# Patient Record
Sex: FEMALE | Race: WHITE | URBAN - METROPOLITAN AREA
[De-identification: names, ages, dates, MRNs, and addresses within clinical notes are randomized per-mention and may not be internally consistent; named-entity substitution may affect disease eponyms.]

---

## 2018-06-26 ENCOUNTER — PAT TELEPHONE (OUTPATIENT)
Dept: PREADMISSION TESTING | Age: 73
End: 2018-06-26

## 2018-06-26 VITALS — WEIGHT: 223 LBS | BODY MASS INDEX: 35.84 KG/M2 | HEIGHT: 66 IN

## 2018-06-26 RX ORDER — HYDROCORTISONE ACETATE 0.5 %
CREAM (GRAM) TOPICAL
COMMUNITY

## 2018-06-26 RX ORDER — M-VIT,TX,IRON,MINS/CALC/FOLIC 27MG-0.4MG
1 TABLET ORAL DAILY
COMMUNITY

## 2018-07-03 ENCOUNTER — HOSPITAL ENCOUNTER (OUTPATIENT)
Dept: ENDOSCOPY | Age: 73
Discharge: HOME | End: 2018-07-03
Attending: INTERNAL MEDICINE | Admitting: INTERNAL MEDICINE

## 2018-07-03 VITALS
SYSTOLIC BLOOD PRESSURE: 141 MMHG | HEIGHT: 66 IN | WEIGHT: 225 LBS | OXYGEN SATURATION: 100 % | RESPIRATION RATE: 16 BRPM | TEMPERATURE: 98.3 F | DIASTOLIC BLOOD PRESSURE: 81 MMHG | HEART RATE: 71 BPM | BODY MASS INDEX: 36.16 KG/M2

## 2018-07-03 DIAGNOSIS — Z86.010 HISTORY OF COLONIC POLYPS: ICD-10-CM

## 2018-07-03 RX ORDER — SODIUM CHLORIDE 0.9 % (FLUSH) 0.9 %
10 SYRINGE (ML) INJECTION EVERY 12 HOURS SCHEDULED
Status: DISCONTINUED | OUTPATIENT
Start: 2018-07-03 | End: 2018-07-04 | Stop reason: HOSPADM

## 2018-07-03 RX ORDER — LABETALOL HYDROCHLORIDE 5 MG/ML
5 INJECTION, SOLUTION INTRAVENOUS EVERY 10 MIN PRN
Status: DISCONTINUED | OUTPATIENT
Start: 2018-07-03 | End: 2018-07-04 | Stop reason: HOSPADM

## 2018-07-03 RX ORDER — SODIUM CHLORIDE 9 MG/ML
INJECTION, SOLUTION INTRAVENOUS CONTINUOUS
Status: DISCONTINUED | OUTPATIENT
Start: 2018-07-03 | End: 2018-07-04 | Stop reason: HOSPADM

## 2018-07-03 RX ORDER — DIPHENHYDRAMINE HYDROCHLORIDE 50 MG/ML
12.5 INJECTION INTRAMUSCULAR; INTRAVENOUS
Status: ACTIVE | OUTPATIENT
Start: 2018-07-03 | End: 2018-07-03

## 2018-07-03 RX ORDER — PROMETHAZINE HYDROCHLORIDE 25 MG/ML
6.25 INJECTION, SOLUTION INTRAMUSCULAR; INTRAVENOUS
Status: ACTIVE | OUTPATIENT
Start: 2018-07-03 | End: 2018-07-03

## 2018-07-03 RX ORDER — SODIUM CHLORIDE 0.9 % (FLUSH) 0.9 %
10 SYRINGE (ML) INJECTION PRN
Status: DISCONTINUED | OUTPATIENT
Start: 2018-07-03 | End: 2018-07-04 | Stop reason: HOSPADM

## 2018-07-03 RX ORDER — SODIUM CHLORIDE, SODIUM LACTATE, POTASSIUM CHLORIDE, CALCIUM CHLORIDE 600; 310; 30; 20 MG/100ML; MG/100ML; MG/100ML; MG/100ML
INJECTION, SOLUTION INTRAVENOUS CONTINUOUS
Status: DISCONTINUED | OUTPATIENT
Start: 2018-07-03 | End: 2018-07-03

## 2018-07-03 RX ORDER — HYDRALAZINE HYDROCHLORIDE 20 MG/ML
5 INJECTION INTRAMUSCULAR; INTRAVENOUS EVERY 10 MIN PRN
Status: DISCONTINUED | OUTPATIENT
Start: 2018-07-03 | End: 2018-07-04 | Stop reason: HOSPADM

## 2018-07-03 RX ORDER — MORPHINE SULFATE 4 MG/ML
1 INJECTION, SOLUTION INTRAMUSCULAR; INTRAVENOUS EVERY 5 MIN PRN
Status: DISCONTINUED | OUTPATIENT
Start: 2018-07-03 | End: 2018-07-04 | Stop reason: HOSPADM

## 2018-07-03 RX ORDER — OXYCODONE HYDROCHLORIDE AND ACETAMINOPHEN 5; 325 MG/1; MG/1
2 TABLET ORAL PRN
Status: ACTIVE | OUTPATIENT
Start: 2018-07-03 | End: 2018-07-03

## 2018-07-03 RX ORDER — OXYCODONE HYDROCHLORIDE AND ACETAMINOPHEN 5; 325 MG/1; MG/1
1 TABLET ORAL PRN
Status: ACTIVE | OUTPATIENT
Start: 2018-07-03 | End: 2018-07-03

## 2018-07-03 RX ORDER — LIDOCAINE HYDROCHLORIDE 10 MG/ML
1 INJECTION, SOLUTION EPIDURAL; INFILTRATION; INTRACAUDAL; PERINEURAL
Status: ACTIVE | OUTPATIENT
Start: 2018-07-03 | End: 2018-07-03

## 2018-07-03 RX ORDER — ONDANSETRON 2 MG/ML
4 INJECTION INTRAMUSCULAR; INTRAVENOUS
Status: ACTIVE | OUTPATIENT
Start: 2018-07-03 | End: 2018-07-03

## 2018-07-03 RX ORDER — MEPERIDINE HYDROCHLORIDE 25 MG/ML
12.5 INJECTION INTRAMUSCULAR; INTRAVENOUS; SUBCUTANEOUS EVERY 5 MIN PRN
Status: DISCONTINUED | OUTPATIENT
Start: 2018-07-03 | End: 2018-07-04 | Stop reason: HOSPADM

## 2018-07-03 RX ORDER — MORPHINE SULFATE 4 MG/ML
2 INJECTION, SOLUTION INTRAMUSCULAR; INTRAVENOUS EVERY 5 MIN PRN
Status: DISCONTINUED | OUTPATIENT
Start: 2018-07-03 | End: 2018-07-04 | Stop reason: HOSPADM

## 2018-07-03 RX ADMIN — SODIUM CHLORIDE: 9 INJECTION, SOLUTION INTRAVENOUS at 07:07

## 2018-07-03 ASSESSMENT — PAIN SCALES - GENERAL: PAINLEVEL_OUTOF10: 0

## 2018-07-03 ASSESSMENT — PAIN - FUNCTIONAL ASSESSMENT: PAIN_FUNCTIONAL_ASSESSMENT: 0-10

## 2018-07-03 ASSESSMENT — PAIN DESCRIPTION - LOCATION: LOCATION: PELVIS

## 2018-07-03 NOTE — ANESTHESIA PRE-OP
complications:   Airway: Mallampati: II  TM distance: >3 FB   Neck ROM: full  Mouth opening: > = 3 FB Dental: normal exam         Pulmonary:Negative Pulmonary ROS and normal exam                               Cardiovascular:Negative CV ROS  Exercise tolerance: good (>4 METS),                     Neuro/Psych:   Negative Neuro/Psych ROS              GI/Hepatic/Renal: Neg GI/Hepatic/Renal ROS       (-) hiatal hernia and GERD       Endo/Other: Negative Endo/Other ROS   (+) malignancy/cancer (Breast Cancer). Abdominal:           Vascular:                                     Pre-Operative Diagnosis: POLYP SURVEILLANCE/ MCR, C    71 y.o.   BMI:  Body mass index is 36.32 kg/m². Vitals:    07/03/18 0656   BP: (!) 141/90   Pulse: 72   Resp: 16   Temp: 97.7 °F (36.5 °C)   TempSrc: Temporal   SpO2: 99%   Weight: 225 lb (102.1 kg)   Height: 5' 6\" (1.676 m)       No Known Allergies    Social History   Substance Use Topics    Smoking status: Never Smoker    Smokeless tobacco: Never Used    Alcohol use Yes       LABS:    CBC  No results found for: WBC, HGB, HCT, PLT  RENAL  No results found for: NA, K, CL, CO2, BUN, CREATININE, GLUCOSE  COAGS  No results found for: PROTIME, INR, APTT     Anesthesia Plan      general     ASA 2     (I discussed with the patient the risks and benefits of PIV, general anesthesia, IV Narcotics, PACU. All questions were answered the patient agrees with the plan.)  Induction: intravenous. Anesthetic plan and risks discussed with patient. Plan discussed with CRNA.                   Keiry Aleman MD   7/3/2018

## 2018-07-03 NOTE — PROCEDURES
[facility]  Endoscopy Note    Patient: Ana Maria Winn  : 1945  Acct#: [de-identified]    Procedure: Colonoscopy with biopsy    Date:  7/3/2018    Surgeon:  Terry Brady MD    Referring Physician:  Cleveland Barahona    Previous Colonoscopy: Yes  Date: 08  Greater than 3 years? Yes    Preoperative Diagnosis:  surveillance    Postoperative Diagnosis:  Colon polyp    Anesthesia:  See anesthesia note    Indications: This is a 71y.o. year old female who presents today with previous adenomatous polyp. Procedure: An informed consent was obtained from the patient after explanation of indications, benefits, possible risks and complications of the procedure. The patient was then taken to the endoscopy suite, placed in the left lateral decubitus position, and the above IV anesthesia was administered. A digital rectal examination was performed and revealed negative without mass, lesions or tenderness. The Olympus CFQ-180-AL video colonoscope was placed in the patient's rectum under digital direction and advanced to the cecum. The cecum was identified by characteristic anatomy and ballottment. The ileocecal valve was identified. The preparation was excellent. The scope was then withdrawn back through the cecum, ascending, transverse, descending and sigmoid colons. Carefull circumferential examination of the mucosa in these areas demonstrated a 5 mm polyp on a fold in the ascending colon that was biopsied and removed. The scope was then withdrawn into the rectum and retroflexed. The retroflexed view of the anal verge and rectum demonstrates no abnormalities. The scope was straightened, the colon was decompressed and the scope was withdrawn from the patient. The patient tolerated the procedure well and was taken to the PACU in good condition. Estimated Blood Loss:  none    Impression:  Colon polyp    Recommendations:  Await pathology. Repeat colonoscopy in 5 years.     Terry Brady MD

## 2018-07-03 NOTE — PROGRESS NOTES
Dr. Radha Holly here to speak with pt. And her . Discharge instructions discussed. Understanding verbalized.

## 2018-07-03 NOTE — H&P
[facility]   Pre-operative History and Physical    Patient: Kristofer Silverman  : 1945  Acct#: [de-identified]    History Obtained From: electronic medical record    HISTORY OF PRESENT ILLNESS  Procedure:Colonoscopy  Indications:surveillance  Past Medical History:        Diagnosis Date    Cancer [facility])     BREAST     Past Surgical History:        Procedure Laterality Date    BREAST RECONSTRUCTION      CHOLECYSTECTOMY      ECTOPIC PREGNANCY SURGERY      GALLBLADDER SURGERY      MASTECTOMY       Medications Prior to Admission:   Current Outpatient Prescriptions on File Prior to Encounter   Medication Sig Dispense Refill    Multiple Vitamins-Minerals (THERAPEUTIC MULTIVITAMIN-MINERALS) tablet Take 1 tablet by mouth daily      Glucosamine-Chondroit-Vit C-Mn (GLUCOSAMINE 1500 COMPLEX) CAPS Take by mouth       No current facility-administered medications on file prior to encounter. Allergies:  Patient has no known allergies. Social History     Social History    Marital status:      Spouse name: N/A    Number of children: N/A    Years of education: N/A     Occupational History    Not on file. Social History Main Topics    Smoking status: Never Smoker    Smokeless tobacco: Never Used    Alcohol use Yes    Drug use: Unknown    Sexual activity: Not on file     Other Topics Concern    Not on file     Social History Narrative    No narrative on file     Family History   Problem Relation Age of Onset    Cancer Mother         LUNG    Heart Disease Father     Other Father         AAA         PHYSICAL EXAM:      BP (!) 141/90   Pulse 72   Temp 97.7 °F (36.5 °C) (Temporal)   Resp 16   Ht 5' 6\" (1.676 m)   Wt 225 lb (102.1 kg)   SpO2 99%   BMI 36.32 kg/m²  I        Heart:normal    Lungs: normal    Abdomen: normal      ASA Grade:  See anesthesia note      ASSESSMENT AND PLAN:    1. Procedure options, risks and benefits reviewed with patient and expresses understanding.

## 2019-03-18 ENCOUNTER — HOSPITAL ENCOUNTER (OUTPATIENT)
Dept: PHYSICAL THERAPY | Age: 74
Setting detail: THERAPIES SERIES
Discharge: HOME | End: 2019-03-18
Payer: MEDICARE

## 2019-03-18 PROCEDURE — 97530 THERAPEUTIC ACTIVITIES: CPT

## 2019-03-18 PROCEDURE — 97161 PT EVAL LOW COMPLEX 20 MIN: CPT

## 2019-03-20 ENCOUNTER — HOSPITAL ENCOUNTER (OUTPATIENT)
Dept: PHYSICAL THERAPY | Age: 74
Setting detail: THERAPIES SERIES
Discharge: HOME | End: 2019-03-20
Payer: MEDICARE

## 2019-03-20 PROCEDURE — G0283 ELEC STIM OTHER THAN WOUND: HCPCS

## 2019-03-20 PROCEDURE — 97140 MANUAL THERAPY 1/> REGIONS: CPT

## 2019-03-20 PROCEDURE — 97530 THERAPEUTIC ACTIVITIES: CPT

## 2019-03-22 ENCOUNTER — HOSPITAL ENCOUNTER (OUTPATIENT)
Dept: PHYSICAL THERAPY | Age: 74
Setting detail: THERAPIES SERIES
Discharge: HOME | End: 2019-03-22
Payer: MEDICARE

## 2019-03-22 PROCEDURE — G0283 ELEC STIM OTHER THAN WOUND: HCPCS

## 2019-03-22 PROCEDURE — 97110 THERAPEUTIC EXERCISES: CPT

## 2019-03-22 PROCEDURE — 97140 MANUAL THERAPY 1/> REGIONS: CPT

## 2019-03-22 PROCEDURE — 97530 THERAPEUTIC ACTIVITIES: CPT

## 2019-03-25 ENCOUNTER — HOSPITAL ENCOUNTER (OUTPATIENT)
Dept: PHYSICAL THERAPY | Age: 74
Setting detail: THERAPIES SERIES
Discharge: HOME | End: 2019-03-25
Payer: MEDICARE

## 2019-03-25 PROCEDURE — 97110 THERAPEUTIC EXERCISES: CPT

## 2019-03-25 PROCEDURE — G0283 ELEC STIM OTHER THAN WOUND: HCPCS

## 2019-03-27 ENCOUNTER — HOSPITAL ENCOUNTER (OUTPATIENT)
Dept: PHYSICAL THERAPY | Age: 74
Setting detail: THERAPIES SERIES
Discharge: HOME | End: 2019-03-27
Payer: MEDICARE

## 2019-03-27 PROCEDURE — 97110 THERAPEUTIC EXERCISES: CPT

## 2019-03-27 PROCEDURE — G0283 ELEC STIM OTHER THAN WOUND: HCPCS

## 2019-03-27 PROCEDURE — 97112 NEUROMUSCULAR REEDUCATION: CPT

## 2019-03-29 ENCOUNTER — HOSPITAL ENCOUNTER (OUTPATIENT)
Dept: PHYSICAL THERAPY | Age: 74
Setting detail: THERAPIES SERIES
Discharge: HOME | End: 2019-03-29
Payer: MEDICARE

## 2019-03-29 PROCEDURE — 97110 THERAPEUTIC EXERCISES: CPT

## 2019-04-01 ENCOUNTER — HOSPITAL ENCOUNTER (OUTPATIENT)
Dept: PHYSICAL THERAPY | Age: 74
Setting detail: THERAPIES SERIES
Discharge: HOME | End: 2019-04-01
Payer: MEDICARE

## 2019-04-01 PROCEDURE — 97110 THERAPEUTIC EXERCISES: CPT

## 2019-04-01 NOTE — FLOWSHEET NOTE
Physical Therapy Daily Treatment Note  Date:  2019    Patient Name:  Anish Frias    :  1945  MRN: [id number]  Restrictions/Precautions:    Medical/Treatment Diagnosis Information:   · Diagnosis: R TKR performed on 19  · Treatment Diagnosis: Decreased R Knee AROM, Strength and Balance/Proprioception    Tracking Information:  Physician Information Referring Practitioner: Dr. Kathi Maldonado of Care Sent Date: 3/18/19 Signed Received: 3/26   Visit Count / Total Visits      Insurance Approved Visits  Med Nec Approved Dates:     Insurance Information PT Insurance Information: Medicare    Progress Note/G-codes   []  Yes  [x]  No Next Due: 10th session     Pain level: 4/10      Subjective:  Pt reports this is really early for her. Slept really well on Saturday night because she was busy on Saturday.  she was really sore - thinks she needs to get up and move more when she is doing things. Took a pain pill this morning. Objective:   Observation:   3/25: pt amb with SPC unsure if at the correct height   3/22: Pt to clinic with RW, assessed ambulation with cane, pt safe to progress to cane. Test measurements:    : AROM lacking 2 deg to 100 deg  3/25: AROM: lacking 3 deg to 95 deg  3/22: R knee flexion: 96 deg, R knee extension: lacking 5 deg.      Exercises:  Exercise/Equipment Resistance/Repetitions Other comments   Bike    nustep     level 5 seat 9 x 5 minutes, VCs for flexing and ext as far as possible     stairs HSS 2 x 20 sec on 2nd 6 in step   Knee flexion stretch 2 x 20 sec on 2nd 6 inch step    Hip flexion from 6 inch step x 10 B  without UE support x 10 B       VCs for neutral knee and foot   // bars           amb with neutral foot position B x 80 feet with  no UEs     LAQ with 4# weight sitting EOM x 10 on R    TG Squats 2 x 10  Single leg squats on R x 10 small ROM, x 10 on L normal ROM      Gait training         IB/HR/TR 30 sec x 2, x 10 each     cables TKE 2.5 plates 2 x 10                                     Other Therapeutic Activities:  3/18/19: Patient education on PT and plan of care including diagnosis, prognosis, treatment goals and options. Home Exercise Program:  3/18/19: The following exercises were performed and added to the pt's home program (educated on appropriate frequency, intensity and duration etc.): Towel Knee Flexion/Extension Stretch, Ankle pumps.  Distributed HO     Manual Treatments:    3/22: PROM R knee flexion/extension x 8 min   3/20: PROM R knee flexion/extension x 12 min    Modalities:    4/1, 3/29: CP to R knee in reclined sitting x 10 minutes  3/27, 3/25, 3/22, 3/20: CP + ESTIM (IFC) R knee x 15 min     Timed Code Treatment Minutes: 28    Total Treatment Minutes:   38     Treatment/Activity Tolerance:  [x] Patient tolerated treatment well [] Patient limited by fatigue  [] Patient limited by pain  [] Patient limited by other medical complications  [x] Other: quad strength improving as well as AROM    Prognosis: [x] Good [] Fair  [] Poor    Patient Requires Follow-up: [x] Yes  [] No    Plan:   [x] Continue per plan of care [] Alter current plan (see comments)  [] Plan of care initiated [] Hold pending MD visit [] Discharge    Plan for Next Session:   Manual to improve knee ROM, gait training, // bars/balance/proprioception training, LE strengthening, Modalities prn for pain     Electronically signed by:  Isai Blanchard, PT, DPT

## 2019-04-03 ENCOUNTER — HOSPITAL ENCOUNTER (OUTPATIENT)
Dept: PHYSICAL THERAPY | Age: 74
Setting detail: THERAPIES SERIES
Discharge: HOME | End: 2019-04-03
Payer: MEDICARE

## 2019-04-03 PROCEDURE — 97110 THERAPEUTIC EXERCISES: CPT

## 2019-04-03 NOTE — FLOWSHEET NOTE
Physical Therapy Daily Treatment Note  Date:  4/3/2019    Patient Name:  Ramonita Fernández    :  1945  MRN: [id number]  Restrictions/Precautions:    Medical/Treatment Diagnosis Information:   · Diagnosis: R TKR performed on 19  · Treatment Diagnosis: Decreased R Knee AROM, Strength and Balance/Proprioception    Tracking Information:  Physician Information Referring Practitioner: Dr. Mary Ann Harper of Care Sent Date: 3/18/19 Signed Received: 3/26   Visit Count / Total Visits      Insurance Approved Visits  Med Nec Approved Dates:     Insurance Information PT Insurance Information: Medicare    Progress Note/G-codes   []  Yes  [x]  No Next Due: 10th session     Pain level: 2/10      Subjective:  Pt reports she hasn't had a lot of pain today. Went to a  this morning and was working on her income taxes. Leaving Friday for vacation. Got in her hot tub last night. Objective:   Observation:   3/25: pt amb with SPC unsure if at the correct height   3/22: Pt to clinic with RW, assessed ambulation with cane, pt safe to progress to cane. Test measurements:    : AROM lacking 2 deg to 100 deg  3/25: AROM: lacking 3 deg to 95 deg  3/22: R knee flexion: 96 deg, R knee extension: lacking 5 deg.      Exercises:  Exercise/Equipment Resistance/Repetitions Other comments   Bike    nustep x5 min, seat 15 level 1  half revs to full retro revs to forward full revs         stairs HSS 2 x 20 sec on 2nd 6 in step   Knee flexion stretch 2 x 20 sec on 2nd 6 inch step         VCs for neutral knee and foot   // bars           amb with neutral foot position B x 80 feet with  no UEs    mat     TG Squats 2 x 10  Single leg squats on R x 10 normal ROM      Gait training     amb without AD and HHA x 30 feet VCs for heel toe patten and exaggerated knee flexion     amb without AD or UE support x 30 feet with nomalized pattern     IB/HR/TR 30 sec x 2, x 10 each     cables TKE 3 plates  2 x 10 Other Therapeutic Activities:  3/18/19: Patient education on PT and plan of care including diagnosis, prognosis, treatment goals and options. Home Exercise Program:    4/3: issued HO of squats, TKE, heel slides, LAQ, hip abd with band, 3 way hip with band, HS stretch, hip flexor stretch, SLR   3/18/19: The following exercises were performed and added to the pt's home program (educated on appropriate frequency, intensity and duration etc.): Towel Knee Flexion/Extension Stretch, Ankle pumps.  Distributed HO     Manual Treatments:    3/22: PROM R knee flexion/extension x 8 min   3/20: PROM R knee flexion/extension x 12 min    Modalities:    4/3, 4/1, 3/29: CP to R knee in reclined sitting x 10 minutes  3/27, 3/25, 3/22, 3/20: CP + ESTIM (IFC) R knee x 15 min     Timed Code Treatment Minutes: 25    Total Treatment Minutes:   35     Treatment/Activity Tolerance:  [x] Patient tolerated treatment well [] Patient limited by fatigue  [] Patient limited by pain  [] Patient limited by other medical complications  [x] Other: pt able to complete full revolution on bike this date, gait pattern improving     Prognosis: [x] Good [] Fair  [] Poor    Patient Requires Follow-up: [x] Yes  [] No    Plan:   [x] Continue per plan of care [] Alter current plan (see comments)  [] Plan of care initiated [] Hold pending MD visit [] Discharge    Plan for Next Session:   Manual to improve knee ROM, gait training, // bars/balance/proprioception training, LE strengthening, Modalities prn for pain     Electronically signed by:  Severa Pollen, PT, DPT

## 2019-04-17 ENCOUNTER — HOSPITAL ENCOUNTER (OUTPATIENT)
Dept: PHYSICAL THERAPY | Age: 74
Setting detail: THERAPIES SERIES
Discharge: HOME | End: 2019-04-17
Payer: MEDICARE

## 2019-04-17 PROCEDURE — G0283 ELEC STIM OTHER THAN WOUND: HCPCS

## 2019-04-17 PROCEDURE — 97140 MANUAL THERAPY 1/> REGIONS: CPT

## 2019-04-17 PROCEDURE — 97530 THERAPEUTIC ACTIVITIES: CPT

## 2019-04-17 PROCEDURE — 97110 THERAPEUTIC EXERCISES: CPT

## 2019-04-17 NOTE — FLOWSHEET NOTE
Physical Therapy Daily Treatment Note  Date:  2019    Patient Name:  Deanne Chowdhury    :  1945  MRN: [id number]  Restrictions/Precautions:    Medical/Treatment Diagnosis Information:   · Diagnosis: R TKR performed on 19  · Treatment Diagnosis: Decreased R Knee AROM, Strength and Balance/Proprioception    Tracking Information:  Physician Information Referring Practitioner: Dr. Sanchez Round of Care Sent Date: 3/18/19 Signed Received: 3/26   Visit Count / Total Visits      Insurance Approved Visits  Med Nec Approved Dates:     Insurance Information PT Insurance Information: Medicare    Progress Note/G-codes   []  Yes  [x]  No Next Due: 10th session     Pain level: 2/10      Subjective:  Pt reports that she is doing well, just got back from her trip from [facility]. Pt reports that during the trip her knee felt okay, but she did notice that she was more tired than usual.  Feels like she is able to walk a little faster than before. Is anxious to be 100%. States that she was not as diligent with her exercises as she maybe should have been. Objective:   Observation:   3/25: pt amb with SPC unsure if at the correct height   3/22: Pt to clinic with RW, assessed ambulation with cane, pt safe to progress to cane. Test measurements:    : PROM R knee flexion: 105 deg, extension: lacking 5 deg. : AROM lacking 2 deg to 100 deg  3/25: AROM: lacking 3 deg to 95 deg  3/22: R knee flexion: 96 deg, R knee extension: lacking 5 deg.      Exercises:  Exercise/Equipment Resistance/Repetitions Other comments   Bike    nustep x5 min, seat 15 level 1  half revs to full retro revs to forward full revs         stairs HSS 2 x 20 sec on 2nd 6 in step   Knee flexion stretch 3 x 20 sec on 2nd 6 inch step      6 inch forward step ups x 10 leading R with 1 UE support focus on eccentric control6 inch lat step ups leading R x 10 with 1 UE support, focus on eccentric control   VCs for neutral knee and foot   // bars           amb with neutral foot position B x 80 feet with  no UEs    mat     TG Squats 2 x 10        Gait training             IB/HR/TR 30 sec x 2, x 10 each     cables TKE 3 plates  x 10, 5 sec hold. Other Therapeutic Activities:  3/18/19: Patient education on PT and plan of care including diagnosis, prognosis, treatment goals and options. Home Exercise Program:    4/17: Stressed importance of quad sets and prolonged extension prop to regain full knee extension. 4/3: issued HO of squats, TKE, heel slides, LAQ, hip abd with band, 3 way hip with band, HS stretch, hip flexor stretch, SLR   3/18/19: The following exercises were performed and added to the pt's home program (educated on appropriate frequency, intensity and duration etc.): Towel Knee Flexion/Extension Stretch, Ankle pumps. Distributed HO     Manual Treatments:    4/17, 3/22: PROM R knee flexion/extension x 8 min   3/20: PROM R knee flexion/extension x 12 min    Modalities:    4/17: CP + ESTIM R knee in extension prop with foam preventing ER at the hip for 1/2 the time. x15 min. 4/3, 4/1, 3/29: CP to R knee in reclined sitting x 10 minutes  3/27, 3/25, 3/22, 3/20: CP + ESTIM (IFC) R knee x 15 min     Timed Code Treatment Minutes: 38    Total Treatment Minutes:   53     Treatment/Activity Tolerance:  [x] Patient tolerated treatment well [] Patient limited by fatigue  [] Patient limited by pain  [] Patient limited by other medical complications  [x] Other: Pt stiff as she has not had therapy in 10 days d/t trip. Increased pain with manual techniques. Pt continues to demo gait impairments from knee stiffness, aggressively stretched this date.       Prognosis: [x] Good [] Fair  [] Poor    Patient Requires Follow-up: [x] Yes  [] No    Plan:   [x] Continue per plan of care [] Alter current plan (see comments)  [] Plan of care initiated [] Hold pending MD visit [] Discharge    Plan for Next Session:

## 2019-04-19 ENCOUNTER — HOSPITAL ENCOUNTER (OUTPATIENT)
Dept: PHYSICAL THERAPY | Age: 74
Setting detail: THERAPIES SERIES
Discharge: HOME | End: 2019-04-19
Payer: MEDICARE

## 2019-04-19 PROCEDURE — 97110 THERAPEUTIC EXERCISES: CPT

## 2019-04-19 PROCEDURE — 97140 MANUAL THERAPY 1/> REGIONS: CPT

## 2019-04-19 ASSESSMENT — PAIN DESCRIPTION - ORIENTATION: ORIENTATION: RIGHT

## 2019-04-19 ASSESSMENT — PAIN DESCRIPTION - LOCATION: LOCATION: KNEE

## 2019-04-19 ASSESSMENT — PAIN SCALES - GENERAL: PAINLEVEL_OUTOF10: 3

## 2019-04-19 NOTE — PROGRESS NOTES
Outpatient Physical Therapy    Phone: [phone] Fax: [phone]    Physical Therapy Progress Note  Date: 2019        Patient Name:  Ramon Ding    :  1945  MRN: [id number]  Restrictions/Precautions:      Medical/Treatment Diagnosis Information:  · Diagnosis: R TKR   · Treatment Diagnosis: Decreased R Knee AROM, Sdecreased strength, decreased balance/proprioception  Insurance/Certification information:  PT Insurance Information: Medicare   Physician Information:  Referring Practitioner: Dr. Moriah Grey of care signed (Y/N): Y   Visit# / total visits:  10/18  Pain level: 3/10; 5/10 stiffness     Time Period for Report: 3/18/19 - 19  Cancels/No-shows to date: 0     Plan of Care/Treatment to date:  X Therapeutic Exercise      X Modalities:  X Therapeutic Activity        ? Ultrasound    X Gait Training         ? Cervical Traction   X Neuromuscular Re-education      ? Cold/hotpack    X Instruction in HEP        ? Lumbar Traction  X Manual Therapy        ? Electrical Stimulation            ? Aquatic Therapy        ? Iontophoresis            ? Lymphedema management  ? Women's Health     Other:  ? Vestibular Rehab        ?    ?  Needed                        Significant Findings At Last Visit/Comments:    Subjective:  Subjective  Subjective: The patient reports she has noticed improvement since beginning PT, and is no longer using an AD. She comments that she continues to have difficulty with squatting, prolonged standing, stairs and knee flexion. Pain is much less, and stiffness has become more of a limitation lately. She reports walking has improved, but remains a limitation.    Pain Screening  Patient Currently in Pain: Yes  Pain Assessment  Pain Assessment: 0-10  Pain Level: 3(5/10 stiffness)  Pain Location: Knee  Pain Orientation: Right      Objective:         Observation/Palpation  Palpation: +1 TTP medial joint line, globalized knee  Observation: stiffened appearance of R knee flexion with gait; circumduction w/ RLE step up  AROM RLE (degrees)  R Knee Flexion 0-145: 110(AAROM)  R Knee Extension 0: lack 5deg(AAROM)  Strength RLE  R Hip Flexion: 3+/5  R Hip ABduction: 4-/5  R Knee Flexion: 4-/5  R Knee Extension: 4-/5  R Ankle Dorsiflexion: 5/5  R Ankle Inversion: 5/5  Tone RLE  RLE Tone: Normotonic  Tone LLE  LLE Tone: Normotonic  Motor Control  Gross Motor?: WFL(decreased eccentric control)     Sensation  Overall Sensation Status: WFL  Bed Mobility  Scooting: Independent  Transfers  Sit to Stand: Modified independent(hand reliance)  Stand to sit: Independent  Ambulation  Ambulation?: Yes  Ambulation 1  Surface: level tile  Device: No Device  Assistance: Independent  Quality of Gait: stiffened RLE in swing phase  Distance: 150ft in clinic  Stairs/Curb  Stairs?: Yes  Stairs  Rails: Bilateral  Assistance: Modified independent   Comment: nonreciprocal down stairs, reciprocal up; circumduction of RLE ascending stairs  Balance  Posture: Fair  Sitting - Static: Good  Sitting - Dynamic: Good  Standing - Static: Fair  Standing - Dynamic: Fair  Tandem Stance R Leg: 10  Single Leg Stance R Leg: 10  Comments: NBOS = 10sec, Semi-tandem R = 10sec        Functional Outcome Measures:  LEFS Total Score: 43         Assessment:  Conditions Requiring Skilled Therapeutic Intervention  Body structures, Functions, Activity limitations: Decreased functional mobility , Decreased high-level IADLs, Decreased ADL status, Decreased endurance, Decreased ROM, Decreased strength, Decreased balance, Increased Pain  Assessment: The patient has progressed with PT services in regards to strength, ROM, activity tolerance and pain levels. The patient continues to demonstrate weakness in the RLE (hip and knee), as well as limited knee ROM. The patient's activity tolerance continues to need improvement, specifically, squatting, stair navigation, prolonged standing and walking.  The patient should continue with PT services at this time to further facilitate improvement across areas of limitation. Treatment Diagnosis: Decreased R Knee AROM, Sdecreased strength, decreased balance/proprioception  Prognosis: Good  REQUIRES PT FOLLOW UP: Yes    Plan:   Plan  Times per week: 2-3x  Plan weeks: x 6 weeks   Current Treatment Recommendations: Strengthening, Gait Training, Manual Therapy - Joint Manipulation, Aquatics, ROM, Stair training, IADL Training, Equipment Evaluation, Education, & procurement, Balance Training, Neuromuscular Re-education, Pain Management, Modalities, Functional Mobility Training, Endurance Training, Manual Therapy - Soft Tissue Mobilization, Home Exercise Program, Positioning, Safety Education & Training, Transfer Training, ADL/Self-care Training, Patient/Caregiver Education & Training          Progress towards goals:    Long term goals  Time Frame for Long term goals : 6 weeks   Long term goal 1: HEP: Patient will be independent with HEP to improve muscle strength and function as well as decrease risk of falls/injury. - PROGRESSING  Long term goal 2: Ambulation Tolerance: Patient will be able to walk 30' or greater to demonstrate increased ability to shop at grocery and ambulate in community. - PROGRESSING  Long term goal 3: AROM: R Knee ROM to 0-120 to demonstrate improved gait mechanics and improved ability to perform daily activities and dressing. - PROGRESSING  Long term goal 4: MMT: Patient will demonstrate 4+/5 or higher MMT grades throughout BLEs to improve function and ability to perform ADLs. - PROGRESSING  Long term goal 5: Stairs: Patient able to traverse stairs with reciprocal pattern using only one hand rail to demonstrate return to prior level of function. - PROGRESSING    Current Frequency/Duration:  # Days per week: ? 1 day # Weeks: ? 1 week ? 4 weeks      X 2 days   ? 2 weeks ? 5 weeks      ? 3 days   ? 3 weeks X 6-8 weeks     Rehab Potential: ? Excellent X Good ? Fair  ? Poor     Goal Status:  ?

## 2019-04-22 ENCOUNTER — HOSPITAL ENCOUNTER (OUTPATIENT)
Dept: PHYSICAL THERAPY | Age: 74
Setting detail: THERAPIES SERIES
Discharge: HOME | End: 2019-04-22
Payer: MEDICARE

## 2019-04-22 PROCEDURE — 97110 THERAPEUTIC EXERCISES: CPT

## 2019-04-22 NOTE — FLOWSHEET NOTE
Physical Therapy Daily Treatment Note  Date:  2019    Patient Name:  Makayla Collins    :  1945  MRN: [id number]  Restrictions/Precautions:    Medical/Treatment Diagnosis Information:   · Diagnosis: R TKR performed on 19  · Treatment Diagnosis: Decreased R Knee AROM, Strength and Balance/Proprioception    Tracking Information:  Physician Information Referring Practitioner: Dr. Wil Small of Care Sent Date: 3/18/19 Signed Received: 3/26   Visit Count / Total Visits      Insurance Approved Visits  Med Nec Approved Dates:     Insurance Information PT Insurance Information: Medicare    Progress Note/G-codes   []  Yes  [x]  No Next Due:  session     Pain level: 3/10, stiffness 3/10      Subjective:  Pt reports she isn't walking with AD. Has been doing exercises at home. Hasn't been icing at home. Feels that she still can't get her knee totally straight. Objective:   Observation:   3/25: pt amb with SPC unsure if at the correct height   3/22: Pt to clinic with RW, assessed ambulation with cane, pt safe to progress to cane. Test measurements:    : AAROM on TG flexion 105 deg, ext lacking 5 def - end of session AROM flexion 110 deg to lacking 4 deg ext  : PROM R knee flexion: 105 deg, extension: lacking 5 deg. : AROM lacking 2 deg to 100 deg  3/25: AROM: lacking 3 deg to 95 deg  3/22: R knee flexion: 96 deg, R knee extension: lacking 5 deg.      Exercises:  Exercise/Equipment Resistance/Repetitions Other comments   Bike          nustep x5 min, seat 15 level 1   full retro revs to forward full revs         stairs        VCs for neutral knee and foot   // bars           mat     TG Squats 2 x 10    Single leg squats on R x 10 small ROM      Gait training         amb through clinic without AD and without gait abnormalities     IB/HR/TR    cables 3 way hip 1 plate x 10 on R with 1-2 UE support, VCs for posture and knee ext     Ballet barre Retro gliders x 10 B with B UE support, focus on R knee flexion and ext     Lateral gliders x 10 B with B UE support, VCs for form and weight bearing      new gym Leg press 70# x 10, 80# x 10  Leg ext 15# x 10  Ham curl 35# x 10                          Other Therapeutic Activities:    4/19 - The patient was provided an assessment including evaluative tests and measures, as well as documentation to track progress. 3/18/19: Patient education on PT and plan of care including diagnosis, prognosis, treatment goals and options. Home Exercise Program:    4/19 - emphasized importance of heel propping, knee extension and self-overpressure to improved knee ROM  4/17: Stressed importance of quad sets and prolonged extension prop to regain full knee extension. 4/3: issued HO of squats, TKE, heel slides, LAQ, hip abd with band, 3 way hip with band, HS stretch, hip flexor stretch, SLR   3/18/19: The following exercises were performed and added to the pt's home program (educated on appropriate frequency, intensity and duration etc.): Towel Knee Flexion/Extension Stretch, Ankle pumps. Distributed HO     Manual Treatments:   4/19: PROM R knee ext/flexion, patellar mobs all directions - 12'  4/17, 3/22: PROM R knee flexion/extension x 8 min   3/20: PROM R knee flexion/extension x 12 min    Modalities:    4/22, 4/19 - CP reclined - 10'  4/17: CP + ESTIM R knee in extension prop with foam preventing ER at the hip for 1/2 the time. x15 min.    4/3, 4/1, 3/29: CP to R knee in reclined sitting x 10 minutes  3/27, 3/25, 3/22, 3/20: CP + ESTIM (IFC) R knee x 15 min     Timed Code Treatment Minutes: 35    Total Treatment Minutes:   45     Treatment/Activity Tolerance:  [x] Patient tolerated treatment well [] Patient limited by fatigue  [] Patient limited by pain  [] Patient limited by other medical complications  [x] Other: decreased quad strength, good response to weight machines - required cues for equal effort B     Prognosis: [x] Good [] Fair  [] Poor    Patient Requires Follow-up: [x] Yes  [] No    Plan:   [x] Continue per plan of care [] Alter current plan (see comments)  [] Plan of care initiated [] Hold pending MD visit [] Discharge    Plan for Next Session:   Manual to improve knee ROM (EXTENSION!), gait training, // bars/balance/proprioception training, LE strengthening, stair Tx    Electronically signed by:  Luisana Conde PT, DPT

## 2019-04-24 ENCOUNTER — HOSPITAL ENCOUNTER (OUTPATIENT)
Dept: PHYSICAL THERAPY | Age: 74
Setting detail: THERAPIES SERIES
Discharge: HOME | End: 2019-04-24
Payer: MEDICARE

## 2019-04-24 PROCEDURE — 97110 THERAPEUTIC EXERCISES: CPT

## 2019-04-24 NOTE — FLOWSHEET NOTE
Physical Therapy Daily Treatment Note  Date:  2019    Patient Name:  Aisha Cox    :  1945  MRN: [id number]  Restrictions/Precautions:    Medical/Treatment Diagnosis Information:   · Diagnosis: R TKR performed on 19  · Treatment Diagnosis: Decreased R Knee AROM, Strength and Balance/Proprioception    Tracking Information:  Physician Information Referring Practitioner: Dr. Aldridge Grade of Care Sent Date: 3/18/19 Signed Received: 3/26   Visit Count / Total Visits      Insurance Approved Visits  Med Nec Approved Dates:     Insurance Information PT Insurance Information: Medicare    Progress Note/G-codes   []  Yes  [x]  No Next Due:  session     Pain level: 3/10, stiffness 3/10      Subjective:  Pt reports that she is doing well. Stairs are going much better for her. Objective:   Observation:   3/25: pt amb with SPC unsure if at the correct height   3/22: Pt to clinic with RW, assessed ambulation with cane, pt safe to progress to cane. Test measurements:    : AAROM on TG flexion 105 deg, ext lacking 5 def - end of session AROM flexion 110 deg to lacking 4 deg ext  : PROM R knee flexion: 105 deg, extension: lacking 5 deg. : AROM lacking 2 deg to 100 deg  3/25: AROM: lacking 3 deg to 95 deg  3/22: R knee flexion: 96 deg, R knee extension: lacking 5 deg.      Exercises:  Exercise/Equipment Resistance/Repetitions Other comments   Bike          nustep x5 min, seat 15 level 1   full retro revs to forward full revs         stairs        VCs for neutral knee and foot   // bars           mat     TG Squats 2 x 10    Single leg squats on R x 10 small ROM      Gait training         amb through clinic without AD and without gait abnormalities     IB/HR/TR    cables 3 way hip 1.5 plate x 10 on R with 1-2 UE support, VCs for posture and knee ext   (2pl for extension)   Ballet barre     Lateral gliders x 10 B with B UE support, VCs for form and weight bearing      new gym Leg ext 15# x 10  Ham curl 35# x 15                          Other Therapeutic Activities:    4/19 - The patient was provided an assessment including evaluative tests and measures, as well as documentation to track progress. 3/18/19: Patient education on PT and plan of care including diagnosis, prognosis, treatment goals and options. Home Exercise Program:    4/19 - emphasized importance of heel propping, knee extension and self-overpressure to improved knee ROM  4/17: Stressed importance of quad sets and prolonged extension prop to regain full knee extension. 4/3: issued HO of squats, TKE, heel slides, LAQ, hip abd with band, 3 way hip with band, HS stretch, hip flexor stretch, SLR   3/18/19: The following exercises were performed and added to the pt's home program (educated on appropriate frequency, intensity and duration etc.): Towel Knee Flexion/Extension Stretch, Ankle pumps. Distributed HO     Manual Treatments:   4/19: PROM R knee ext/flexion, patellar mobs all directions - 12'  4/17, 3/22: PROM R knee flexion/extension x 8 min   3/20: PROM R knee flexion/extension x 12 min    Modalities:    4/22, 4/19 - CP reclined - 10'  4/17: CP + ESTIM R knee in extension prop with foam preventing ER at the hip for 1/2 the time. x15 min.    4/24, 4/3, 4/1, 3/29: CP to R knee in reclined sitting x 10 minutes  3/27, 3/25, 3/22, 3/20: CP + ESTIM (IFC) R knee x 15 min     Timed Code Treatment Minutes: 30    Total Treatment Minutes:   40     Treatment/Activity Tolerance:  [x] Patient tolerated treatment well [] Patient limited by fatigue  [] Patient limited by pain  [] Patient limited by other medical complications  [] Other:      Prognosis: [x] Good [] Fair  [] Poor    Patient Requires Follow-up: [x] Yes  [] No    Plan:   [x] Continue per plan of care [] Alter current plan (see comments)  [] Plan of care initiated [] Hold pending MD visit [] Discharge    Plan for Next Session:   Manual to improve knee ROM (EXTENSION!), gait training, // bars/balance/proprioception training, LE strengthening, stair Tx    Electronically signed by:  Pecola Boas, PT, DPT

## 2019-04-26 ENCOUNTER — HOSPITAL ENCOUNTER (OUTPATIENT)
Dept: PHYSICAL THERAPY | Age: 74
Setting detail: THERAPIES SERIES
Discharge: HOME | End: 2019-04-26
Payer: MEDICARE

## 2019-04-26 PROCEDURE — 97110 THERAPEUTIC EXERCISES: CPT

## 2019-04-26 PROCEDURE — 97112 NEUROMUSCULAR REEDUCATION: CPT

## 2019-04-26 PROCEDURE — 97530 THERAPEUTIC ACTIVITIES: CPT

## 2019-04-29 ENCOUNTER — HOSPITAL ENCOUNTER (OUTPATIENT)
Dept: PHYSICAL THERAPY | Age: 74
Setting detail: THERAPIES SERIES
Discharge: HOME | End: 2019-04-29
Payer: MEDICARE

## 2019-04-29 PROCEDURE — 97110 THERAPEUTIC EXERCISES: CPT

## 2019-04-29 PROCEDURE — 97112 NEUROMUSCULAR REEDUCATION: CPT

## 2019-04-29 PROCEDURE — 97530 THERAPEUTIC ACTIVITIES: CPT

## 2019-04-29 NOTE — FLOWSHEET NOTE
foot                                                    B UE support for first 2, then R UE for next 3, last one no UE support     R hip circumduction up stairs - dec throughout reps    // bars     Squats on black side BOSU no UE support    Balance on black side BOSU with EC 30 sec x 2 B, CGA, no UE support      mat Prone knee flexion stretch with SOS 20 sec x 5 on R    TG       Gait training         amb through clinic without AD and without gait abnormalities     IB/HR/TR    cables Needed cueing to avoid glute and soleus compensation    Demonstrated hip ER during abduction and flexion, was able to correct after cueing in flexion    Ballet [employer]          new gym                             Other Therapeutic Activities:    4/29: assessed AROM and PROM, educated pt to stay focused and positive, changes are notable and greatly improved - patient feels her progress is very slow and is uncertain if she is improving at times  4/19 - The patient was provided an assessment including evaluative tests and measures, as well as documentation to track progress. 3/18/19: Patient education on PT and plan of care including diagnosis, prognosis, treatment goals and options. Home Exercise Program:    4/19 - emphasized importance of heel propping, knee extension and self-overpressure to improved knee ROM  4/17: Stressed importance of quad sets and prolonged extension prop to regain full knee extension. 4/3: issued HO of squats, TKE, heel slides, LAQ, hip abd with band, 3 way hip with band, HS stretch, hip flexor stretch, SLR   3/18/19: The following exercises were performed and added to the pt's home program (educated on appropriate frequency, intensity and duration etc.): Towel Knee Flexion/Extension Stretch, Ankle pumps.  Distributed HO     Manual Treatments:   4/26: Anterior and posterior tibiofemoral mobs - Grade 3 and 4 - 5'  4/19: PROM R knee ext/flexion, patellar mobs all directions - 12'  4/17, 3/22: PROM R knee flexion/extension x 8 min   3/20: PROM R knee flexion/extension x 12 min    Modalities:    4/29, 4/26, 4/22, 4/19 - CP reclined - 10'  4/17: CP + ESTIM R knee in extension prop with foam preventing ER at the hip for 1/2 the time. x15 min.    4/24, 4/3, 4/1, 3/29: CP to R knee in reclined sitting x 10 minutes  3/27, 3/25, 3/22, 3/20: CP + ESTIM (IFC) R knee x 15 min     Timed Code Treatment Minutes: 39    Total Treatment Minutes:  49     Treatment/Activity Tolerance:  [x] Patient tolerated treatment well [] Patient limited by fatigue  [] Patient limited by pain  [] Patient limited by other medical complications  [x] Other: focus on AROM and quad control on R     Prognosis: [x] Good [] Fair  [] Poor    Patient Requires Follow-up: [x] Yes  [] No    Plan:   [x] Continue per plan of care [] Alter current plan (see comments)  [] Plan of care initiated [] Hold pending MD visit [] Discharge    Plan for Next Session:   Manual to improve knee ROM (EXTENSION!), gait training, // bars/balance/proprioception training, LE strengthening, stair tx progression and quad eccentric control     Electronically signed by:  Cresencio Levy, PT, DPT

## 2019-05-01 ENCOUNTER — HOSPITAL ENCOUNTER (OUTPATIENT)
Dept: PHYSICAL THERAPY | Age: 74
Setting detail: THERAPIES SERIES
Discharge: HOME | End: 2019-05-01
Payer: MEDICARE

## 2019-05-01 PROCEDURE — 97110 THERAPEUTIC EXERCISES: CPT

## 2019-05-01 NOTE — FLOWSHEET NOTE
Physical Therapy Daily Treatment Note  Date:  2019    Patient Name:  Ryley Galindo    :  1945  MRN: [id number]  Restrictions/Precautions:    Medical/Treatment Diagnosis Information:   · Diagnosis: R TKR performed on 19  · Treatment Diagnosis: Decreased R Knee AROM, Strength and Balance/Proprioception    Tracking Information:  Physician Information Referring Practitioner: Dr. Say Marx of Care Sent Date: 3/18/19 Signed Received: 3/26   Visit Count / Total Visits  15/18    Insurance Approved Visits  Med Nec Approved Dates:     Insurance Information PT Insurance Information: Medicare    Progress Note/G-codes   []  Yes  [x]  No Next Due:  session     Pain level: 3/10      Subjective:  Says knee is still pretty stiff, overall frustrated. States things are better, can go down steps a little bit better with left leg first     Objective:   Observation:   3/25: pt amb with SPC unsure if at the correct height   3/22: Pt to clinic with RW, assessed ambulation with cane, pt safe to progress to cane. Test measurements:    : AROM R knee flexion 107 deg, lacking 2 deg ext    PROM 105 flexion and AROM ext lacking 4  : AAROM on TG flexion 105 deg, ext lacking 5 def - end of session AROM flexion 110 deg to lacking 4 deg ext  : PROM R knee flexion: 105 deg, extension: lacking 5 deg. : AROM lacking 2 deg to 100 deg  3/25: AROM: lacking 3 deg to 95 deg  3/22: R knee flexion: 96 deg, R knee extension: lacking 5 deg.      Exercises:  Exercise/Equipment Resistance/Repetitions Other comments   Bike          nustep x5 min, seat 13 level 1            stairs         8 inch forward step downs leading L with L UE support, VCs for tall posture, R knee flexion, and weight shift x10    8 inch lateral step ups leading R without UE support x 10     VCs for neutral knee and foot                                                    B UE support for first 2, then R UE for next 3, last one no UE support     R hip circumduction up stairs - dec throughout reps    // bars     Squats on black side BOSU no UE support    Balance on black side BOSU with EC 30 sec x 2 B, CGA, no UE support      mat Prone knee flexion stretch with SOS 20 sec x 5 on R    TG       Gait training         amb through clinic without AD and without gait abnormalities     IB/HR/TR    cables Needed cueing to avoid glute and soleus compensation    Demonstrated hip ER during abduction and flexion, was able to correct after cueing in flexion    Ballet barre          new gym   Leg ext 15# x 10, eccentric with RLE TRX Squats x 10   TRX double to single leg squats RLE x 10 (with chair)                           Other Therapeutic Activities:    4/29: assessed AROM and PROM, educated pt to stay focused and positive, changes are notable and greatly improved - patient feels her progress is very slow and is uncertain if she is improving at times  4/19 - The patient was provided an assessment including evaluative tests and measures, as well as documentation to track progress. 3/18/19: Patient education on PT and plan of care including diagnosis, prognosis, treatment goals and options. Home Exercise Program:    4/19 - emphasized importance of heel propping, knee extension and self-overpressure to improved knee ROM  4/17: Stressed importance of quad sets and prolonged extension prop to regain full knee extension. 4/3: issued HO of squats, TKE, heel slides, LAQ, hip abd with band, 3 way hip with band, HS stretch, hip flexor stretch, SLR   3/18/19: The following exercises were performed and added to the pt's home program (educated on appropriate frequency, intensity and duration etc.): Towel Knee Flexion/Extension Stretch, Ankle pumps.  Distributed HO     Manual Treatments:   4/26: Anterior and posterior tibiofemoral mobs - Grade 3 and 4 - 5'  4/19: PROM R knee ext/flexion, patellar mobs all directions - 12'  4/17, 3/22: PROM R knee flexion/extension

## 2019-05-03 ENCOUNTER — HOSPITAL ENCOUNTER (OUTPATIENT)
Dept: PHYSICAL THERAPY | Age: 74
Setting detail: THERAPIES SERIES
Discharge: HOME | End: 2019-05-03
Payer: MEDICARE

## 2019-05-03 PROCEDURE — 97110 THERAPEUTIC EXERCISES: CPT

## 2019-05-03 NOTE — FLOWSHEET NOTE
Physical Therapy Daily Treatment Note  Date:  5/3/2019    Patient Name:  Kim Roberts    :  1945  MRN: [id number]  Restrictions/Precautions:    Medical/Treatment Diagnosis Information:   · Diagnosis: R TKR performed on 19  · Treatment Diagnosis: Decreased R Knee AROM, Strength and Balance/Proprioception    Tracking Information:  Physician Information Referring Practitioner: Dr. Riaz Benavides of Care Sent Date: 3/18/19 Signed Received: 3/26   Visit Count / Total Visits      Insurance Approved Visits  Med Nec Approved Dates:     Insurance Information PT Insurance Information: Medicare    Progress Note/G-codes   []  Yes  [x]  No Next Due:  session     Pain level: 3/10      Subjective:  Pt reports she had a sharp pain in her R groin this morning that scared her. Pain has since gone away. She did sit and watch a play last night. Is still feeling frustrated at how much swelling is present. Objective:   Observation:   3/25: pt amb with SPC unsure if at the correct height    3/22: Pt to clinic with RW, assessed ambulation with cane, pt safe to progress to cane. Test measurements:    : AROM R knee flexion 107 deg, lacking 2 deg ext    PROM 105 flexion and AROM ext lacking 4  : AAROM on TG flexion 105 deg, ext lacking 5 def - end of session AROM flexion 110 deg to lacking 4 deg ext  : PROM R knee flexion: 105 deg, extension: lacking 5 deg. : AROM lacking 2 deg to 100 deg  3/25: AROM: lacking 3 deg to 95 deg  3/22: R knee flexion: 96 deg, R knee extension: lacking 5 deg.      Exercises:  Exercise/Equipment Resistance/Repetitions Other comments   Bike          nustep x5 min, seat 13 level 1            stairs         8 inch forward step down with light B UE support leading L x 10   VCs for neutral knee and foot                                                    B UE support for first 2, then R UE for next 3, last one no UE support     R hip circumduction up stairs - dec throughout reps    // bars           mat     TG       Gait training         amb through clinic without AD and without gait abnormalities     IB/HR/TR 30 sec x 2, x 10 each    cables Needed cueing to avoid glute and soleus compensation    Demonstrated hip ER during abduction and flexion, was able to correct after cueing in flexion    Ballet barre          new gym Leg press   90# 2 x 10  Leg ext 35# x 10, eccentric with RLE 25# x 10 Ham curl 45# x 10  ecc ham curl R 35# x 10TRX Squats x 10   TRX single leg squats x 10 B - greater difficulty with R                          Other Therapeutic Activities:    4/29: assessed AROM and PROM, educated pt to stay focused and positive, changes are notable and greatly improved - patient feels her progress is very slow and is uncertain if she is improving at times  4/19 - The patient was provided an assessment including evaluative tests and measures, as well as documentation to track progress. 3/18/19: Patient education on PT and plan of care including diagnosis, prognosis, treatment goals and options. Home Exercise Program:    4/19 - emphasized importance of heel propping, knee extension and self-overpressure to improved knee ROM  4/17: Stressed importance of quad sets and prolonged extension prop to regain full knee extension. 4/3: issued HO of squats, TKE, heel slides, LAQ, hip abd with band, 3 way hip with band, HS stretch, hip flexor stretch, SLR   3/18/19: The following exercises were performed and added to the pt's home program (educated on appropriate frequency, intensity and duration etc.): Towel Knee Flexion/Extension Stretch, Ankle pumps.  Distributed HO     Manual Treatments:   4/26: Anterior and posterior tibiofemoral mobs - Grade 3 and 4 - 5'  4/19: PROM R knee ext/flexion, patellar mobs all directions - 12'  4/17, 3/22: PROM R knee flexion/extension x 8 min   3/20: PROM R knee flexion/extension x 12 min    Modalities:    5/3, 5/1, 4/29, 4/26, 4/22, 4/19 - CP reclined - 10'  4/17: CP + ESTIM R knee in extension prop with foam preventing ER at the hip for 1/2 the time. x15 min.    4/24, 4/3, 4/1, 3/29: CP to R knee in reclined sitting x 10 minutes  3/27, 3/25, 3/22, 3/20: CP + ESTIM (IFC) R knee x 15 min     Timed Code Treatment Minutes:  35    Total Treatment Minutes:  45     Treatment/Activity Tolerance:  [x] Patient tolerated treatment well [] Patient limited by fatigue  [] Patient limited by pain  [] Patient limited by other medical complications  [x] Other: pt still very apprehensive with descending stairs reciprocally however not painful, encouraged pt to use stairs normally at home        Prognosis: [x] Good [] Fair  [] Poor    Patient Requires Follow-up: [x] Yes  [] No    Plan:   [x] Continue per plan of care [] Alter current plan (see comments)  [] Plan of care initiated [] Hold pending MD visit [] Discharge    Plan for Next Session:   Manual to improve knee ROM (EXTENSION!), gait training, // bars/balance/proprioception training, LE strengthening, stair tx progression and quad eccentric control     Electronically signed by:  Lorene Dove, PT, DPT

## 2019-05-06 ENCOUNTER — HOSPITAL ENCOUNTER (OUTPATIENT)
Dept: PHYSICAL THERAPY | Age: 74
Setting detail: THERAPIES SERIES
Discharge: HOME | End: 2019-05-06
Payer: MEDICARE

## 2019-05-06 PROCEDURE — 97110 THERAPEUTIC EXERCISES: CPT

## 2019-05-06 NOTE — FLOWSHEET NOTE
Physical Therapy Daily Treatment Note  Date:  2019    Patient Name:  Jimmy Mark    :  1945  MRN: [id number]  Restrictions/Precautions:    Medical/Treatment Diagnosis Information:   · Diagnosis: R TKR performed on 19  · Treatment Diagnosis: Decreased R Knee AROM, Strength and Balance/Proprioception    Tracking Information:  Physician Information Referring Practitioner: Dr. Andrew Arias of Care Sent Date: 3/18/19 Signed Received: 3/26   Visit Count / Total Visits      Insurance Approved Visits  Med Nec Approved Dates:     Insurance Information PT Insurance Information: Medicare    Progress Note/G-codes   []  Yes  [x]  No Next Due:  session     Pain level: 2/10      Subjective:  Pt reports she slept much better last night. Wasn't too busy this weekend but did do more exercises. Objective:   Observation:   3/25: pt amb with SPC unsure if at the correct height    3/22: Pt to clinic with RW, assessed ambulation with cane, pt safe to progress to cane. Test measurements:    : AROM R knee flexion 107 deg, lacking 2 deg ext    PROM 105 flexion and AROM ext lacking 4  : AAROM on TG flexion 105 deg, ext lacking 5 def - end of session AROM flexion 110 deg to lacking 4 deg ext  : PROM R knee flexion: 105 deg, extension: lacking 5 deg. : AROM lacking 2 deg to 100 deg  3/25: AROM: lacking 3 deg to 95 deg  3/22: R knee flexion: 96 deg, R knee extension: lacking 5 deg.      Exercises:  Exercise/Equipment Resistance/Repetitions Other comments   Bike          nustep        TM     x5 min, seat 13 level 1        5 min X 1.8 MPH with B UE support     stairs            VCs for neutral knee and foot                                                    B UE support for first 2, then R UE for next 3, last one no UE support     R hip circumduction up stairs - dec throughout reps    // bars           mat     TG       Gait training         amb through clinic without AD and without gait abnormalities     IB/HR/TR 30 sec x 2, x 10 each    cables Needed cueing to avoid glute and soleus compensation    Demonstrated hip ER during abduction and flexion, was able to correct after cueing in flexion    [employer]/ [employer] Ascend/descend 2 flights of stair reciprocally with 1-0 UE support    Squat machine 30# x 10  TRX Squats x 10   TRX single leg squats x 10 B - greater difficulty with R    Sit to stand with 8# kettle bell x 10     Hip abd 50# 2 x 10  Hip add 70# 2 x 10                           Other Therapeutic Activities:    4/29: assessed AROM and PROM, educated pt to stay focused and positive, changes are notable and greatly improved - patient feels her progress is very slow and is uncertain if she is improving at times  4/19 - The patient was provided an assessment including evaluative tests and measures, as well as documentation to track progress. 3/18/19: Patient education on PT and plan of care including diagnosis, prognosis, treatment goals and options. Home Exercise Program:    4/19 - emphasized importance of heel propping, knee extension and self-overpressure to improved knee ROM  4/17: Stressed importance of quad sets and prolonged extension prop to regain full knee extension. 4/3: issued HO of squats, TKE, heel slides, LAQ, hip abd with band, 3 way hip with band, HS stretch, hip flexor stretch, SLR   3/18/19: The following exercises were performed and added to the pt's home program (educated on appropriate frequency, intensity and duration etc.): Towel Knee Flexion/Extension Stretch, Ankle pumps.  Distributed HO     Manual Treatments:   4/26: Anterior and posterior tibiofemoral mobs - Grade 3 and 4 - 5' 4/19: PROM R knee ext/flexion, patellar mobs all directions - 12'  4/17, 3/22: PROM R knee flexion/extension x 8 min   3/20: PROM R knee flexion/extension x 12 min    Modalities:    5/6, 5/3, 5/1, 4/29, 4/26, 4/22, 4/19 - CP reclined - 10' 4/17: CP + ESTIM R knee in extension prop with foam preventing ER at the hip for 1/2 the time. x15 min.    4/24, 4/3, 4/1, 3/29: CP to R knee in reclined sitting x 10 minutes  3/27, 3/25, 3/22, 3/20: CP + ESTIM (IFC) R knee x 15 min     Timed Code Treatment Minutes:  30    Total Treatment Minutes:  40     Treatment/Activity Tolerance:  [x] Patient tolerated treatment well [] Patient limited by fatigue  [] Patient limited by pain  [] Patient limited by other medical complications  [x] Other: DC NV    Prognosis: [x] Good [] Fair  [] Poor    Patient Requires Follow-up: [x] Yes  [] No    Plan:   [x] Continue per plan of care [] Alter current plan (see comments)  [] Plan of care initiated [] Hold pending MD visit [] Discharge    Plan for Next Session:   Manual to improve knee ROM (EXTENSION!), gait training, // bars/balance/proprioception training, LE strengthening, stair tx progression and quad eccentric control     Electronically signed by:  Garfield Vasquez, PT, DPT

## 2019-05-08 ENCOUNTER — APPOINTMENT (OUTPATIENT)
Dept: PHYSICAL THERAPY | Age: 74
End: 2019-05-08
Payer: MEDICARE

## 2019-05-10 ENCOUNTER — APPOINTMENT (OUTPATIENT)
Dept: PHYSICAL THERAPY | Age: 74
End: 2019-05-10
Payer: MEDICARE

## 2019-05-10 ENCOUNTER — HOSPITAL ENCOUNTER (OUTPATIENT)
Dept: PHYSICAL THERAPY | Age: 74
Setting detail: THERAPIES SERIES
Discharge: HOME | End: 2019-05-10
Payer: MEDICARE

## 2019-05-10 PROCEDURE — 97530 THERAPEUTIC ACTIVITIES: CPT

## 2019-05-10 PROCEDURE — 97110 THERAPEUTIC EXERCISES: CPT

## 2019-05-10 NOTE — DISCHARGE SUMMARY
Outpatient Physical Therapy    Phone: [phone] Fax: [phone]    Physical Therapy Discharge Note  Date: 5/10/2019        Patient Name:  Makayla Collins    :  1945  MRN: [id number]  Restrictions/Precautions:    Medical/Treatment Diagnosis Information:   · Diagnosis: R TKR performed on 19  · Treatment Diagnosis: Decreased R Knee AROM, Strength and Balance/Proprioception     Tracking Information:       Physician Information Referring Practitioner: Dr. Wil Small of Care Sent Date: 3/18/19 Signed Received: 3/26   Visit Count / Total Visits       Insurance Approved Visits  Med Nec Approved Dates:     Insurance Information PT Insurance Information: Medicare    Progress Note/G-codes   [x]  Yes                 []  No Next Due: none      Pain level:      0/10        Time Period for Report: 19- 5/10/19  Cancels/No-shows to date: 0     Plan of Care/Treatment to date:  X Therapeutic Exercise      X Modalities:  X Therapeutic Activity        ? Ultrasound    X Gait Training         ? Cervical Traction   X Neuromuscular Re-education      ? Cold/hotpack    X Instruction in HEP        ? Lumbar Traction  X Manual Therapy        ? Electrical Stimulation            ? Aquatic Therapy        ? Iontophoresis            ? Lymphedema management  ? Women's Health     Other:  ? Vestibular Rehab        ?    ?  Needed                        Significant Findings At Last Visit/Comments:    Subjective:    Pt reports she is ready for discharge today. Plans to use the [email or web address] pass. States she has improved about 80% since beginning therapy. Is able to go up and downs stairs properly but she still gets very tired.               Objective:            AROM LLE (degrees)  L Knee Flexion 0-145: 123 degrees   L Knee Extension 0: lacking 5 deg  AROM RLE (degrees)  R Knee Flexion 0-145: 110 deg  R Knee Extension 0: lacking 5 deg  Strength RLE  R Hip Flexion: 5/5  R Hip ABduction: 5/5  R Knee Flexion: and ambulate in community. - MET  Long term goal 3: AROM: R Knee ROM to 0-120 to demonstrate improved gait mechanics and improved ability to perform daily activities and dressing. - NOT MET (lacking 5 to 110 deg)  Long term goal 4: MMT: Patient will demonstrate 4+/5 or higher MMT grades throughout BLEs to improve function and ability to perform ADLs. - MET  Long term goal 5: Stairs: Patient able to traverse stairs with reciprocal pattern using only one hand rail to demonstrate return to prior level of function. - MET    Current Frequency/Duration:  # Days per week: ? 1 day # Weeks: ? 1 week ? 4 weeks      X 2 days   ? 2 weeks ? 5 weeks      ? 3 days   ? 3 weeks X 6-8 weeks     Rehab Potential: ? Excellent X Good ? Fair  ? Poor     Goal Status:  ? Achieved X Partially Achieved  ? Not Achieved     Patient Status: ? Continue per initial plan of Care     X Patient now discharged     ?  Additional visits requested, Please re-certify for additional visits:      Requested frequency/duration:  X/week for weeks    Electronically signed by:  Cecilia Williamson PT, DPT        Physician Signature:________________________________Date:__________________  By signing above, therapists plan is approved by physician

## 2019-05-10 NOTE — FLOWSHEET NOTE
Physical Therapy Daily Treatment Note  Date:  5/10/2019    Patient Name:  Kim Roberts    :  1945  MRN: [id number]  Restrictions/Precautions:    Medical/Treatment Diagnosis Information:   · Diagnosis: R TKR performed on 19  · Treatment Diagnosis: Decreased R Knee AROM, Strength and Balance/Proprioception    Tracking Information:  Physician Information Referring Practitioner: Dr. Riaz Benavides of Care Sent Date: 3/18/19 Signed Received: 3/26   Visit Count / Total Visits      Insurance Approved Visits  Med Nec Approved Dates:     Insurance Information PT Insurance Information: Medicare    Progress Note/G-codes   [x]  Yes  []  No Next Due: none     Pain level: 0/10      Subjective:  Pt reports she is ready for discharge today. Plans to use the [employer] pass. States she has improved about 80% since beginning therapy. Is able to go up and downs stairs properly but she still gets very tired. Objective:   Observation:   3/25: pt amb with SPC unsure if at the correct height    3/22: Pt to clinic with RW, assessed ambulation with cane, pt safe to progress to cane. Test measurements:    : AROM R knee flexion 107 deg, lacking 2 deg ext    PROM 105 flexion and AROM ext lacking 4  : AAROM on TG flexion 105 deg, ext lacking 5 def - end of session AROM flexion 110 deg to lacking 4 deg ext  : PROM R knee flexion: 105 deg, extension: lacking 5 deg. : AROM lacking 2 deg to 100 deg  3/25: AROM: lacking 3 deg to 95 deg  3/22: R knee flexion: 96 deg, R knee extension: lacking 5 deg.      Exercises:  Exercise/Equipment Resistance/Repetitions Other comments   Bike          nustep        TM            level 5 seat 9 x 5 minutes,     stairs            VCs for neutral knee and foot                                                    B UE support for first 2, then R UE for next 3, last one no UE support     R hip circumduction up stairs - dec throughout reps    // bars           mat TG Squats 2 x 10    Single leg squats on R x 10 small ROM      Gait training         amb through clinic without AD and without gait abnormalities     IB/HR/TR 30 sec x 2, x 10 each    cables Needed cueing to avoid glute and soleus compensation    Demonstrated hip ER during abduction and flexion, was able to correct after cueing in flexion    Ballet barre          new gym/ [employer]plex                           Other Therapeutic Activities:    5/10: Reassessed goals, discussed progress made and areas remaining for improvement, issued outcome measure and [employer]   4/29: assessed AROM and PROM, educated pt to stay focused and positive, changes are notable and greatly improved - patient feels her progress is very slow and is uncertain if she is improving at times  4/19 - The patient was provided an assessment including evaluative tests and measures, as well as documentation to track progress. 3/18/19: Patient education on PT and plan of care including diagnosis, prognosis, treatment goals and options. Home Exercise Program:    4/19 - emphasized importance of heel propping, knee extension and self-overpressure to improved knee ROM  4/17: Stressed importance of quad sets and prolonged extension prop to regain full knee extension. 4/3: issued HO of squats, TKE, heel slides, LAQ, hip abd with band, 3 way hip with band, HS stretch, hip flexor stretch, SLR   3/18/19: The following exercises were performed and added to the pt's home program (educated on appropriate frequency, intensity and duration etc.): Towel Knee Flexion/Extension Stretch, Ankle pumps.  Distributed HO     Manual Treatments:   4/26: Anterior and posterior tibiofemoral mobs - Grade 3 and 4 - 5' 4/19: PROM R knee ext/flexion, patellar mobs all directions - 12' 4/17, 3/22: PROM R knee flexion/extension x 8 min   3/20: PROM R knee flexion/extension x 12 min    Modalities:    5/6, 5/3, 5/1, 4/29, 4/26, 4/22, 4/19 - CP reclined - 10'  4/17: CP + ESTIM R

## 2025-05-16 NOTE — PLAN OF CARE
[place]    Exercises  - Seated AAROM Shoulder Flexion  - 1 x daily - 7 x weekly - 3 sets - 10 reps  - Seated Shoulder External Rotation AAROM with Dowel  - 1 x daily - 7 x weekly - 3 sets - 10 reps  - Seated Cervical Sidebending Stretch  - 1 x daily - 7 x weekly - 3 sets - 30 seconds hold  - Seated Scapular Retraction  - 1 x daily - 7 x weekly - 3 sets - 10 reps - 10 seconds hold  ASSESSMENT   Assessment:   Court Jean is a 79 y.o. female presenting today to Outpatient PT with signs and symptoms consistent with R shoulder RTC impairment. Discussed f/u  with MD for MRI.     Pt. presents with the functional impairments and activity limitations listed below and would benefit from Outpatient PT to address the below impairments as well as improve pain, and restore function.     Functional Impairments:   Noted spinal or UE joint hypomobility  Decreased UE functional ROM  Decreased UE functional strength  Decreased RC/scapular/core strength and neuromuscular control    Functional Activity Limitations (from functional questionnaire and intake):  Any of your usual work, housework, or school activities  Lifting a bag of groceries to waist level  Lifting a bag of groceries above your head  Grooming your hair  Pushing up on your hands (eg, from bathtub or chair)  Preparing food (eg, peeling, cutting)  Cleaning  Tying or lacing shoes  Sleeping  Laundering clothes (eg, washing, ironing, folding)  Opening a jar  Throwing a ball  Carrying a small suitcase with your affected limb  Reaching up into a cabinet  Reduced ability or tolerance with any impact through UE or Spine  Reaching behind back     Participation Restrictions:   Reduced participation in self care  Reduced participation in home management   Reduced participation in social activities  Reduced participation in sports/recreation    Classification :   Signs/symptoms consistent with rotator cuff tear  Signs/symptoms consistent with postural dysfunction

## 2025-05-19 ENCOUNTER — HOSPITAL ENCOUNTER (OUTPATIENT)
Dept: PHYSICAL THERAPY | Age: 80
Setting detail: THERAPIES SERIES
Discharge: HOME | End: 2025-05-19
Payer: MEDICARE

## 2025-05-19 DIAGNOSIS — M43.6 STIFFNESS OF CERVICAL SPINE: ICD-10-CM

## 2025-05-19 DIAGNOSIS — R53.1 WEAKNESS: Primary | ICD-10-CM

## 2025-05-19 DIAGNOSIS — M25.611 DECREASED ROM OF RIGHT SHOULDER: ICD-10-CM

## 2025-05-19 DIAGNOSIS — R29.3 POSTURAL IMBALANCE: ICD-10-CM

## 2025-05-19 PROCEDURE — 97530 THERAPEUTIC ACTIVITIES: CPT

## 2025-05-19 PROCEDURE — 97161 PT EVAL LOW COMPLEX 20 MIN: CPT

## 2025-05-19 PROCEDURE — 97110 THERAPEUTIC EXERCISES: CPT

## 2025-05-21 ENCOUNTER — HOSPITAL ENCOUNTER (OUTPATIENT)
Dept: PHYSICAL THERAPY | Age: 80
Setting detail: THERAPIES SERIES
Discharge: HOME | End: 2025-05-21
Payer: MEDICARE

## 2025-05-21 PROCEDURE — 97110 THERAPEUTIC EXERCISES: CPT

## 2025-05-21 PROCEDURE — 97140 MANUAL THERAPY 1/> REGIONS: CPT

## 2025-05-21 NOTE — FLOWSHEET NOTE
Frequency/Duration: 2x/week for 6 weeks for the following treatment interventions:    Interventions:  Therapeutic Exercise (63168) including: strength training, ROM, and functional mobility  Therapeutic Activities (95796) including: functional mobility training and education.  Neuromuscular Re-education (29743) activation and proprioception, including postural re-education.    Manual Therapy (00833) as indicated to include: Passive Range of Motion, Gr I-IV mobilizations, Soft Tissue Mobilization, Dry Needling/IASTM, Manual Lymph Drainage, Trigger Point Release, and Myofascial Release  Modalities as needed that may include: Cryotherapy, Electrical Stimulation, Biofeedback, Thermal Agents, and Traction  Patient education on joint protection, postural re-education, activity modification, and progression of HEP  CRP for assessment, treatment and education of BPPV    Plan: Cont POC- Continue emphasis/focus on exercise progression, improving proper muscle recruitment and activation/motor control patterns, modulating pain, increasing ROM, and improving postural awareness. Next visit plan to continue current phase     Electronically Signed by Yo Tsang PT, DPT, CNS  Date: 05/21/2025   Note: Portions of this note have been templated and/or copied from initial evaluation, reassessments and prior notes for documentation efficiency.  Note: If patient does not return for scheduled/recommended follow up visits, this note will serve as a discharge from care along with the most recent update on progress.    Ortho Evaluation

## 2025-05-28 ENCOUNTER — APPOINTMENT (OUTPATIENT)
Dept: PHYSICAL THERAPY | Age: 80
End: 2025-05-28
Payer: MEDICARE

## 2025-05-28 ENCOUNTER — HOSPITAL ENCOUNTER (OUTPATIENT)
Dept: PHYSICAL THERAPY | Age: 80
Setting detail: THERAPIES SERIES
Discharge: HOME | End: 2025-05-28
Payer: MEDICARE

## 2025-05-28 DIAGNOSIS — R53.1 WEAKNESS: Primary | ICD-10-CM

## 2025-05-28 DIAGNOSIS — R26.81 GAIT INSTABILITY: ICD-10-CM

## 2025-05-28 DIAGNOSIS — R26.89 IMPAIRMENT OF BALANCE: ICD-10-CM

## 2025-05-28 DIAGNOSIS — R29.3 POSTURAL IMBALANCE: ICD-10-CM

## 2025-05-28 PROCEDURE — 97110 THERAPEUTIC EXERCISES: CPT

## 2025-05-28 PROCEDURE — 97140 MANUAL THERAPY 1/> REGIONS: CPT

## 2025-05-28 PROCEDURE — 97161 PT EVAL LOW COMPLEX 20 MIN: CPT

## 2025-05-28 NOTE — PLAN OF CARE
[facility] - Outpatient Rehabilitation and Therapy: [address], [place] [address] office: [phone] fax: [phone]     Physical Therapy Initial Evaluation Certification      Dear Bartolo New MD ,    We had the pleasure of evaluating the following patient for physical therapy services at [facility] Outpatient Physical Therapy.  A summary of our findings can be found in the initial assessment below.  This includes our plan of care.  If you have any questions or concerns regarding these findings, please do not hesitate to contact me at the office phone number listed above.  Thank you for the referral.     Physician Signature:_______________________________Date:__________________  By signing above (or electronic signature), therapist’s plan is approved by physician       Physical Therapy: TREATMENT/PROGRESS NOTE   Patient: Court Jean (79 y.o. female)   Examination Date: 2025   :  1945 MRN: [id number]   Visit #: 1 / TBD  Insurance Allowable Auth Needed   [facility] Medicare ([facility], BMN, auth req)  [x]Yes    []No    Insurance: Payor: [facility] MEDICARE / Plan: [facility] MEDICARE COMPLETE / Product Type: *No Product type* /   Insurance ID: [id number] - (Medicare Managed)  Secondary Insurance (if applicable):    Treatment Diagnosis:     ICD-10-CM    1. Weakness  R53.1       2. Impairment of balance  R26.89       3. Gait instability  R26.81       4. Postural imbalance  R29.3          Medical Diagnosis:  Strain of muscle, fascia and tendon at neck level, initial encounter [S16.1XXA]  Presence of right artificial knee joint [Z96.651]   Referring Physician: Bartolo New MD  PCP: Bobbi Rosenbaum MD     Plan of care signed (Y/N):     Date of Patient follow up with Physician:      Plan of Care Report: EVAL today  POC update due: (10 visits /OR AUTH LIMITS, whichever is less)  2025                                             Medical History:  Comorbidities/Relevant Medical

## 2025-05-28 NOTE — FLOWSHEET NOTE
[facility] - Outpatient Rehabilitation and Therapy: [address], [address], [place] [address] office: [phone] fax: [phone]    Physical Therapy: TREATMENT/PROGRESS NOTE   Patient: Court Jean (79 y.o. female)   Examination Date: 2025   :  1945 MRN: [id number]   Visit #: 3 / 12  Insurance Allowable Auth Needed   [facility] Medicare ([facility], BMN, auth req)    Treatment dx code(s): M25.611  Approval dates: 25 TO 25  Approved visits: 12 VISITS   Approved codes: NOT SPECIFIC   Codes that DO NOT require auth: NO  Denied codes: NO    [x]Yes    []No    Insurance: Payor: [facility] MEDICARE / Plan: [facility] MEDICARE COMPLETE / Product Type: *No Product type* /   Insurance ID: [id number] - (Medicare Managed)  Secondary Insurance (if applicable):    Treatment Diagnosis:         1. Weakness  R53.1         2. Decreased ROM of right shoulder  M25.611         3. Postural imbalance  R29.3         4. Stiffness of cervical spine  M43.6           Medical Diagnosis:  Strain of muscle, fascia and tendon at neck level, initial encounter [S16.1XXA]  Presence of right artificial knee joint [Z96.651]   Referring Physician: Bartolo New MD  PCP: Bobbi Rosenbaum MD     Plan of care signed (Y/N):     Date of Patient follow up with Physician:      Plan of Care Report: NO  POC update due: (10 visits /OR AUTH LIMITS, whichever is less)  2025                                             Medical History:  Comorbidities/Relevant Medical History: Breast CA  Hearing aids                                           Precautions/ Contra-indications:           Latex allergy:  NO  Pacemaker:    NO  Contraindications for Manipulation: None  Date of Surgery: L knee TKR 22, double mastectomy 35 years ago, gallbladder removed 30 years ago  Other:    Red Flags:  None    Suicide Screening:   The patient did not verbalize a primary behavioral concern, suicidal ideation, suicidal intent, or demonstrate suicidal

## 2025-05-30 ENCOUNTER — APPOINTMENT (OUTPATIENT)
Dept: PHYSICAL THERAPY | Age: 80
End: 2025-05-30
Payer: MEDICARE

## 2025-06-03 ENCOUNTER — APPOINTMENT (OUTPATIENT)
Dept: PHYSICAL THERAPY | Age: 80
End: 2025-06-03
Payer: MEDICARE

## 2025-06-05 ENCOUNTER — HOSPITAL ENCOUNTER (OUTPATIENT)
Dept: PHYSICAL THERAPY | Age: 80
Setting detail: THERAPIES SERIES
End: 2025-06-05
Payer: MEDICARE

## 2025-06-09 ENCOUNTER — APPOINTMENT (OUTPATIENT)
Dept: PHYSICAL THERAPY | Age: 80
End: 2025-06-09
Payer: MEDICARE

## 2025-06-16 ENCOUNTER — APPOINTMENT (OUTPATIENT)
Dept: PHYSICAL THERAPY | Age: 80
End: 2025-06-16
Payer: MEDICARE

## 2025-06-16 ENCOUNTER — HOSPITAL ENCOUNTER (OUTPATIENT)
Dept: PHYSICAL THERAPY | Age: 80
Setting detail: THERAPIES SERIES
Discharge: HOME | End: 2025-06-16
Payer: MEDICARE

## 2025-06-16 PROCEDURE — 97530 THERAPEUTIC ACTIVITIES: CPT

## 2025-06-16 PROCEDURE — 97110 THERAPEUTIC EXERCISES: CPT

## 2025-06-16 PROCEDURE — 97140 MANUAL THERAPY 1/> REGIONS: CPT

## 2025-06-16 NOTE — FLOWSHEET NOTE
improve heel strike and ambulation during gait.   [] Progressing: [] Met: [] Not Met: [] Adjusted  6.  Patient will demonstrate increased Strength of BLE to at least 4/5 throughout without pain to allow for proper functional mobility to enable patient to return to perform stair negotiation up one flight of stairs with unilateral UE use.   [] Progressing: [] Met: [] Not Met: [] Adjusted      Overall Progression Towards Functional goals/ Treatment Progress Update:  [] Patient is progressing as expected towards functional goals listed.    [] Progression is slowed due to complexities/Impairments listed.  [] Progression has been slowed due to co-morbidities.  [x] Plan just implemented, too soon (<30days) to assess goals progression   [] Goals require adjustment due to lack of progress  [] Patient is not progressing as expected and requires additional follow up with physician  [] Other:     TREATMENT PLAN     Frequency/Duration: 2x/week for 6 weeks for the following treatment interventions:    Interventions:  Therapeutic Exercise (93087) including: strength training, ROM, and functional mobility  Therapeutic Activities (19558) including: functional mobility training and education.  Neuromuscular Re-education (88982) activation and proprioception, including postural re-education.    Manual Therapy (22654) as indicated to include: Passive Range of Motion, Gr I-IV mobilizations, Soft Tissue Mobilization, Dry Needling/IASTM, Manual Lymph Drainage, Trigger Point Release, and Myofascial Release  Modalities as needed that may include: Cryotherapy, Electrical Stimulation, Biofeedback, Thermal Agents, and Traction  Patient education on joint protection, postural re-education, activity modification, and progression of HEP  CRP for assessment, treatment and education of BPPV    Plan: Cont POC- Continue emphasis/focus on exercise progression, improving proper muscle recruitment and activation/motor control patterns, modulating pain,

## 2025-06-18 ENCOUNTER — APPOINTMENT (OUTPATIENT)
Dept: PHYSICAL THERAPY | Age: 80
End: 2025-06-18
Payer: MEDICARE

## 2025-06-18 ENCOUNTER — HOSPITAL ENCOUNTER (OUTPATIENT)
Dept: PHYSICAL THERAPY | Age: 80
Setting detail: THERAPIES SERIES
Discharge: HOME | End: 2025-06-18
Payer: MEDICARE

## 2025-06-18 PROCEDURE — 97140 MANUAL THERAPY 1/> REGIONS: CPT

## 2025-06-18 PROCEDURE — 97110 THERAPEUTIC EXERCISES: CPT

## 2025-06-18 PROCEDURE — 97530 THERAPEUTIC ACTIVITIES: CPT

## 2025-06-18 NOTE — FLOWSHEET NOTE
degrees and C rotation to 60 deg B without pain to allow for proper joint functioning to enable patient to improve ability to reach into cabinets and safety with driving for head mobility.   [] Progressing: [] Met: [] Not Met: [] Adjusted  Patient will demonstrate increased Strength of BUE to at least 4/5 throughout without pain to allow for proper functional mobility to enable patient to return to lifting groceries at home.   [] Progressing: [] Met: [] Not Met: [] Adjusted  Patient will return to sleeping 6-8 hours without increased symptoms or restriction to demonstrate improvements in quality of life.   [] Progressing: [] Met: [] Not Met: [] Adjusted  5.  Patient will demonstrate increased AROM of LLE knee extension to 0 degrees without pain to allow for proper joint functioning to enable patient to improve heel strike and ambulation during gait.   [] Progressing: [] Met: [] Not Met: [] Adjusted  6.  Patient will demonstrate increased Strength of BLE to at least 4/5 throughout without pain to allow for proper functional mobility to enable patient to return to perform stair negotiation up one flight of stairs with unilateral UE use.   [] Progressing: [] Met: [] Not Met: [] Adjusted      Overall Progression Towards Functional goals/ Treatment Progress Update:  [] Patient is progressing as expected towards functional goals listed.    [] Progression is slowed due to complexities/Impairments listed.  [] Progression has been slowed due to co-morbidities.  [x] Plan just implemented, too soon (<30days) to assess goals progression   [] Goals require adjustment due to lack of progress  [] Patient is not progressing as expected and requires additional follow up with physician  [] Other:     TREATMENT PLAN     Frequency/Duration: 2x/week for 6 weeks for the following treatment interventions:    Interventions:  Therapeutic Exercise (87060) including: strength training, ROM, and functional mobility  Therapeutic Activities

## 2025-06-30 ENCOUNTER — APPOINTMENT (OUTPATIENT)
Dept: PHYSICAL THERAPY | Age: 80
End: 2025-06-30
Payer: MEDICARE

## 2025-06-30 ENCOUNTER — HOSPITAL ENCOUNTER (OUTPATIENT)
Dept: PHYSICAL THERAPY | Age: 80
Setting detail: THERAPIES SERIES
Discharge: HOME | End: 2025-06-30
Payer: MEDICARE

## 2025-06-30 PROCEDURE — 97750 PHYSICAL PERFORMANCE TEST: CPT

## 2025-06-30 PROCEDURE — 97110 THERAPEUTIC EXERCISES: CPT

## 2025-06-30 NOTE — PLAN OF CARE
[facility] - Outpatient Rehabilitation and Therapy: [address], [place] [address] office: [phone] fax: [phone]    Physical Therapy Re-Certification Plan of Care    Dear Bartolo New MD  ,    We had the pleasure of treating the following patient for physical therapy services at [facility]. A summary of our findings can be found in the updated assessment below.  This includes our plan of care.  If you have any questions or concerns regarding these findings, please do not hesitate to contact me at the office phone number checked above.  Thank you for the referral.     Physician Signature:________________________________Date:__________________  By signing above (or electronic signature), therapist's plan is approved by physician      Total Visits: 6     Overall Response to Treatment:  Court has attended 6 sessions of therapy and had some improvements in her cervical ROM RUE flexion and abduction ROM but continues to show deficits in strength. Plan to have RTC surgery end of July so is going to continue with HEP to maintain ROM and strength until surgery. In meantime, will continue with BLE skilled physical therapy as she continues to demonstrate deficits in LLE ROM and BLE strength. Testing was limited this date as patient reported more pain after stepping off of high truck and lack of sleep. Will continue to benefit from skilled therapy. Adjusted goals this date to reflect moving forward with BLE skilled physical therapy.    Recommendation:    [x] Continue PT 1x / wk for 4 weeks.   [] Hold PT, pending MD visit   [] Discharge to [facility]. Follow up with PT or MD PRN.     Physical Therapy: TREATMENT/PROGRESS NOTE   Patient: Court Jean (79 y.o. female)   Examination Date: 2025   :  1945 MRN: [id number]   Visit #:   MANUALLY UPDATE - COMBINED EPISODES    Insurance Allowable Auth Needed   [facility] Medicare ([facility], BMN, auth

## 2025-07-08 ENCOUNTER — HOSPITAL ENCOUNTER (OUTPATIENT)
Dept: PHYSICAL THERAPY | Age: 80
Setting detail: THERAPIES SERIES
Discharge: HOME | End: 2025-07-08
Payer: MEDICARE

## 2025-07-08 PROCEDURE — 97110 THERAPEUTIC EXERCISES: CPT

## 2025-07-08 PROCEDURE — 97530 THERAPEUTIC ACTIVITIES: CPT

## 2025-07-08 PROCEDURE — 97112 NEUROMUSCULAR REEDUCATION: CPT

## 2025-07-08 NOTE — FLOWSHEET NOTE
[facility] - Outpatient Rehabilitation and Therapy: [address], [address], [place] [address] office: [phone] fax: [phone]    Physical Therapy: TREATMENT/PROGRESS NOTE   Patient: Court Jean (79 y.o. female)   Examination Date: 2025   :  1945 MRN: [id number]   Visit #:   MANUALLY UPDATE - COMBINED EPISODES    Insurance Allowable Auth Needed   [facility] Medicare ([facility], [facility], auth req)    Treatment dx code(s): M25.611  Approval dates: 25 - 25  Approved visits: 4 visit   Approved codes: NOT SPECIFIC   Codes that DO NOT require auth: NO  Denied codes: NO    [x]Yes    []No    Insurance: Payor: [facility] MEDICARE / Plan: [facility] MEDICARE COMPLETE / Product Type: *No Product type* /   Insurance ID: [id number] - (Medicare Managed)  Secondary Insurance (if applicable):    Treatment Diagnosis:         1. Weakness  R53.1         2. Decreased ROM of right shoulder  M25.611         3. Postural imbalance  R29.3         4. Stiffness of cervical spine  M43.6           Medical Diagnosis:  Strain of muscle, fascia and tendon at neck level, initial encounter [S16.1XXA]  Presence of right artificial knee joint [Z96.651]   Referring Physician: Bartolo New MD  PCP: Bobbi Rosenbaum MD     Plan of care signed (Y/N): N    Date of Patient follow up with Physician: surgery scheduled for  with Jey ANN shoulder      Plan of Care Report: YES, Date Range for this report: 25 to 25  POC update due: (10 visits /OR AUTH LIMITS, whichever is less)  2025                                             Medical History:  Comorbidities/Relevant Medical History: Breast CA  Hearing aids, hx of L blood clot in 2024 summer but states that she was on blood thinners for 4 months,                                            Precautions/ Contra-indications:           Latex allergy:  NO  Pacemaker:    NO  Contraindications for Manipulation: None  Date of Surgery: R knee TKR  2019, double

## 2025-07-15 ENCOUNTER — HOSPITAL ENCOUNTER (OUTPATIENT)
Dept: PHYSICAL THERAPY | Age: 80
Setting detail: THERAPIES SERIES
Discharge: HOME | End: 2025-07-15
Payer: MEDICARE

## 2025-07-15 PROCEDURE — 97110 THERAPEUTIC EXERCISES: CPT

## 2025-07-15 PROCEDURE — 97112 NEUROMUSCULAR REEDUCATION: CPT

## 2025-07-15 NOTE — FLOWSHEET NOTE
[facility] - Outpatient Rehabilitation and Therapy: [address], [address], [place] [address] office: [phone] fax: [phone]    Physical Therapy: TREATMENT/PROGRESS NOTE   Patient: Court Jean (79 y.o. female)   Examination Date: 07/15/2025   :  1945 MRN: [id number]   Visit #:   MANUALLY UPDATE - COMBINED EPISODES    Insurance Allowable Auth Needed   [facility] Medicare ([facility], [facility], auth req)    Treatment dx code(s): M25.611  Approval dates: 25 - 25  Approved visits: 4 visit   Approved codes: NOT SPECIFIC   Codes that DO NOT require auth: NO  Denied codes: NO    [x]Yes    []No    Insurance: Payor: [facility] MEDICARE / Plan: [facility] MEDICARE COMPLETE / Product Type: *No Product type* /   Insurance ID: [id number] - (Medicare Managed)  Secondary Insurance (if applicable):    Treatment Diagnosis:         1. Weakness  R53.1         2. Decreased ROM of right shoulder  M25.611         3. Postural imbalance  R29.3         4. Stiffness of cervical spine  M43.6           Medical Diagnosis:  Strain of muscle, fascia and tendon at neck level, initial encounter [S16.1XXA]  Presence of right artificial knee joint [Z96.651]   Referring Physician: Bartolo New MD  PCP: Bobbi Rosenbaum MD     Plan of care signed (Y/N): N    Date of Patient follow up with Physician: surgery scheduled for  with Jey ANN shoulder      Plan of Care Report: NO  POC update due: (10 visits /OR AUTH LIMITS, whichever is less) 2025                                             Medical History:  Comorbidities/Relevant Medical History: Breast CA  Hearing aids, hx of L blood clot in 2024 summer but states that she was on blood thinners for 4 months,                                            Precautions/ Contra-indications:           Latex allergy:  NO  Pacemaker:    NO  Contraindications for Manipulation: None  Date of Surgery: R knee TKR  2019, double mastectomy 35 years ago, gallbladder removed 30

## 2025-07-22 ENCOUNTER — HOSPITAL ENCOUNTER (OUTPATIENT)
Dept: PHYSICAL THERAPY | Age: 80
Setting detail: THERAPIES SERIES
Discharge: HOME | End: 2025-07-22
Payer: MEDICARE

## 2025-07-22 PROCEDURE — 97112 NEUROMUSCULAR REEDUCATION: CPT

## 2025-07-22 PROCEDURE — 97530 THERAPEUTIC ACTIVITIES: CPT

## 2025-07-22 NOTE — FLOWSHEET NOTE
[facility] - Outpatient Rehabilitation and Therapy: [address], [address], [place] [address] office: [phone] fax: [phone]    Physical Therapy: TREATMENT/PROGRESS NOTE   Patient: Court Jean (79 y.o. female)   Examination Date: 2025   :  1945 MRN: [id number]   Visit #:   MANUALLY UPDATE - COMBINED EPISODES    Insurance Allowable Auth Needed   [facility] Medicare ([facility], [facility], auth req)    Treatment dx code(s): M25.611  Approval dates: 25 - 25  Approved visits: 4 visit   Approved codes: NOT SPECIFIC   Codes that DO NOT require auth: NO  Denied codes: NO    [x]Yes    []No    Insurance: Payor: [facility] MEDICARE / Plan: [facility] MEDICARE COMPLETE / Product Type: *No Product type* /   Insurance ID: [id number] - (Medicare Managed)  Secondary Insurance (if applicable):    Treatment Diagnosis:         1. Weakness  R53.1         2. Decreased ROM of right shoulder  M25.611         3. Postural imbalance  R29.3         4. Stiffness of cervical spine  M43.6           Medical Diagnosis:  Strain of muscle, fascia and tendon at neck level, initial encounter [S16.1XXA]  Presence of right artificial knee joint [Z96.651]   Referring Physician: Bartolo New MD  PCP: Bobbi Rosenbaum MD     Plan of care signed (Y/N): N    Date of Patient follow up with Physician: surgery scheduled for  with Jey ANN shoulder      Plan of Care Report: NO  POC update due: (10 visits /OR AUTH LIMITS, whichever is less) 2025                                             Medical History:  Comorbidities/Relevant Medical History: Breast CA  Hearing aids, hx of L blood clot in 2024 summer but states that she was on blood thinners for 4 months,                                            Precautions/ Contra-indications:           Latex allergy:  NO  Pacemaker:    NO  Contraindications for Manipulation: None  Date of Surgery: R knee TKR  2019, double mastectomy 35 years ago, gallbladder removed 30

## 2025-07-29 ENCOUNTER — HOSPITAL ENCOUNTER (OUTPATIENT)
Dept: PHYSICAL THERAPY | Age: 80
Setting detail: THERAPIES SERIES
Discharge: HOME | End: 2025-07-29
Payer: MEDICARE

## 2025-07-29 PROCEDURE — 97110 THERAPEUTIC EXERCISES: CPT

## 2025-07-29 PROCEDURE — 97530 THERAPEUTIC ACTIVITIES: CPT

## 2025-07-29 NOTE — PLAN OF CARE
[facility] - Outpatient Rehabilitation and Therapy: [address], [place] [address] office: [phone] fax: [phone]     Physical Therapy Discharge Summary    Dear Bartolo New MD   ,    We had the pleasure of treating the following patient for physical therapy services at [facility] Out[facility] Therapy.  A summary of our findings can be found in the discharge summary below.  If you have any questions or concerns regarding these findings, please do not hesitate to contact me at the office phone number checked above.  Thank you for the referral.         Total Visits: 10     Recommendation:   [] Hold PT, pending MD visit   [x] Discharge to HEP. Follow up with PT or MD PRN.     Reason for Discharge:  Court has attended 10 sessions of therapy and has made improvements in her LE pain and slight improvements in her strength of her HS but continued hip flexor and gluteal weakness. She had improvements in her self reported functional mobility. She has limited pain in her knee and requested HEP for pool exercises to continue with strengthening at [facility]. She continues to perform her HEP for her UE and states that she is planning to have her surgery for RTC soon after she gets the clearance. She will return to PT for RTC repair/strengthening after that time. Also continued to provided education on RTC surgical recovery and discussed factors including age and recovery time as patient stated she has some days where she has no pain and some days where she has discomfort. She has met 5/7 goals. She is discharged from therapy.             Physical Therapy: TREATMENT/PROGRESS NOTE   Patient: Court Jean (79 y.o. female)   Examination Date: 2025   :  1945 MRN: [id number]   Visit #: 10/ 12  MANUALLY UPDATE - COMBINED EPISODES    Insurance Allowable Auth Needed   [facility] Medicare ([facility], [facility], auth req)    Treatment dx code(s): M25.611  Approval dates: 25 - 25  Approved

## 2025-08-26 ENCOUNTER — OFFICE VISIT (OUTPATIENT)
Dept: ORTHOPEDIC SURGERY | Age: 80
End: 2025-08-26
Payer: MEDICARE

## 2025-08-26 VITALS — HEIGHT: 64 IN | WEIGHT: 266 LBS | BODY MASS INDEX: 45.41 KG/M2

## 2025-08-26 DIAGNOSIS — M19.011 ARTHRITIS OF RIGHT SHOULDER: ICD-10-CM

## 2025-08-26 DIAGNOSIS — S46.011A TRAUMATIC ROTATOR CUFF TEAR, RIGHT, INITIAL ENCOUNTER: Primary | ICD-10-CM

## 2025-08-26 PROCEDURE — G8419 CALC BMI OUT NRM PARAM NOF/U: HCPCS | Performed by: ORTHOPAEDIC SURGERY

## 2025-08-26 PROCEDURE — 99204 OFFICE O/P NEW MOD 45 MIN: CPT | Performed by: ORTHOPAEDIC SURGERY

## 2025-08-26 PROCEDURE — G8427 DOCREV CUR MEDS BY ELIG CLIN: HCPCS | Performed by: ORTHOPAEDIC SURGERY

## 2025-08-26 PROCEDURE — 1090F PRES/ABSN URINE INCON ASSESS: CPT | Performed by: ORTHOPAEDIC SURGERY

## 2025-08-26 PROCEDURE — 1159F MED LIST DOCD IN RCRD: CPT | Performed by: ORTHOPAEDIC SURGERY

## 2025-08-26 PROCEDURE — 1036F TOBACCO NON-USER: CPT | Performed by: ORTHOPAEDIC SURGERY

## 2025-08-26 PROCEDURE — G8400 PT W/DXA NO RESULTS DOC: HCPCS | Performed by: ORTHOPAEDIC SURGERY

## 2025-08-26 PROCEDURE — 1123F ACP DISCUSS/DSCN MKR DOCD: CPT | Performed by: ORTHOPAEDIC SURGERY

## 2025-08-26 PROCEDURE — 1125F AMNT PAIN NOTED PAIN PRSNT: CPT | Performed by: ORTHOPAEDIC SURGERY

## 2025-08-26 RX ORDER — CHOLECALCIFEROL (VITAMIN D3) 50 MCG
1 TABLET ORAL DAILY
COMMUNITY

## 2025-08-26 RX ORDER — LANOLIN ALCOHOL/MO/W.PET/CERES
1000 CREAM (GRAM) TOPICAL DAILY
COMMUNITY

## 2025-08-26 RX ORDER — ACETAMINOPHEN 500 MG
500 TABLET ORAL EVERY 6 HOURS PRN
COMMUNITY